# Patient Record
Sex: FEMALE | URBAN - METROPOLITAN AREA
[De-identification: names, ages, dates, MRNs, and addresses within clinical notes are randomized per-mention and may not be internally consistent; named-entity substitution may affect disease eponyms.]

---

## 2021-06-21 ENCOUNTER — APPOINTMENT (OUTPATIENT)
Age: 26
Setting detail: DERMATOLOGY
End: 2021-06-21

## 2021-06-21 DIAGNOSIS — L71.8 OTHER ROSACEA: ICD-10-CM

## 2021-06-21 PROBLEM — L30.9 DERMATITIS, UNSPECIFIED: Status: ACTIVE | Noted: 2021-06-21

## 2021-06-21 PROCEDURE — OTHER ORDER TESTS: OTHER

## 2021-06-21 PROCEDURE — OTHER COUNSELING: OTHER

## 2021-06-21 PROCEDURE — 99204 OFFICE O/P NEW MOD 45 MIN: CPT

## 2021-06-21 PROCEDURE — OTHER ADDITIONAL NOTES: OTHER

## 2021-06-21 ASSESSMENT — LOCATION ZONE DERM: LOCATION ZONE: FACE

## 2021-06-21 ASSESSMENT — LOCATION SIMPLE DESCRIPTION DERM
LOCATION SIMPLE: RIGHT CHEEK
LOCATION SIMPLE: LEFT CHEEK

## 2021-06-21 ASSESSMENT — LOCATION DETAILED DESCRIPTION DERM
LOCATION DETAILED: RIGHT MEDIAL MALAR CHEEK
LOCATION DETAILED: LEFT MEDIAL MALAR CHEEK

## 2021-06-21 NOTE — HPI: SKIN LESION
What Type Of Note Output Would You Prefer (Optional)?: Bullet Format
How Severe Is Your Skin Lesion?: moderate
Has Your Skin Lesion Been Treated?: not been treated
Is This A New Presentation, Or A Follow-Up?: Skin Lesion
Additional History: Patient finds that she flares in the sunlight. Patient finds that it is triggered when she is out in the sun for hours. It does go away after a few days to a week.

## 2021-06-21 NOTE — PROCEDURE: ADDITIONAL NOTES
Additional Notes: Patient was given lab req form.\\nBased on results will decide whether to refer to rheum or tx for rosacea and/or PMLE
Detail Level: Simple
Render Risk Assessment In Note?: no

## 2021-07-19 ENCOUNTER — RX ONLY (RX ONLY)
Age: 26
End: 2021-07-19

## 2021-07-19 RX ORDER — IVERMECTIN 10 MG/G
CREAM TOPICAL QD
Qty: 45 | Refills: 1 | Status: ERX | COMMUNITY
Start: 2021-07-19

## 2021-07-30 ENCOUNTER — RX ONLY (RX ONLY)
Age: 26
End: 2021-07-30

## 2021-07-30 RX ORDER — IVERMECTIN 10 MG/G
CREAM TOPICAL QD
Qty: 45 | Refills: 1 | Status: ERX